# Patient Record
Sex: FEMALE | Race: WHITE
[De-identification: names, ages, dates, MRNs, and addresses within clinical notes are randomized per-mention and may not be internally consistent; named-entity substitution may affect disease eponyms.]

---
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
## 2021-03-15 ENCOUNTER — APPOINTMENT (OUTPATIENT)
Dept: OTOLARYNGOLOGY | Facility: CLINIC | Age: 21
End: 2021-03-15
Payer: MEDICAID

## 2021-03-15 ENCOUNTER — NON-APPOINTMENT (OUTPATIENT)
Age: 21
End: 2021-03-15

## 2021-03-15 DIAGNOSIS — R09.82 POSTNASAL DRIP: ICD-10-CM

## 2021-03-15 PROCEDURE — 99204 OFFICE O/P NEW MOD 45 MIN: CPT | Mod: 25

## 2021-03-15 PROCEDURE — 99072 ADDL SUPL MATRL&STAF TM PHE: CPT

## 2021-03-15 PROCEDURE — 31575 DIAGNOSTIC LARYNGOSCOPY: CPT

## 2021-03-15 NOTE — HISTORY OF PRESENT ILLNESS
[de-identified] : Patient presents today c/o  recurring ear infection . Start since July 2020 had 9 infections since then. Mainly in the left ear. \par Clogged sensation started yesterday. Denies any recent hearing test. Only has take antibiotic twice for infections.\par Denies dizziness. Occasional tinnitus. Itches ears often. \par \par Also complains of recurrent phlegm in her throat.\par

## 2021-03-15 NOTE — PHYSICAL EXAM
[Midline] : trachea located in midline position [Normal] : no rashes [de-identified] : purulent secretions noted at left, cleaned and suctionned [de-identified] : hypertrophic

## 2021-03-15 NOTE — ASSESSMENT
[FreeTextEntry1] : -left otitis externa. culture done. Prescribed floxin. dry ears precautions.\par \par RTC in 1 week. \par \par -PND and phlegm: recommended anti histamines

## 2021-03-18 RX ORDER — NEOMYCIN SULFATE, POLYMYXIN B SULFATE, HYDROCORTISONE 3.5; 10000; 1 MG/ML; [USP'U]/ML; MG/ML
1 SOLUTION/ DROPS AURICULAR (OTIC)
Qty: 1 | Refills: 0 | Status: ACTIVE | COMMUNITY
Start: 2021-03-18 | End: 1900-01-01

## 2021-03-19 LAB — BACTERIA SPEC CULT: ABNORMAL

## 2021-03-22 ENCOUNTER — APPOINTMENT (OUTPATIENT)
Dept: OTOLARYNGOLOGY | Facility: CLINIC | Age: 21
End: 2021-03-22
Payer: MEDICAID

## 2021-03-22 PROCEDURE — 99072 ADDL SUPL MATRL&STAF TM PHE: CPT

## 2021-03-22 PROCEDURE — 99212 OFFICE O/P EST SF 10 MIN: CPT

## 2021-03-22 NOTE — ASSESSMENT
[FreeTextEntry1] : culture results reviewed, interpreted and discussed.\par \par I discussed with the patient the pathophysiology and location on a drawing of an otitis externa, and explained the risk factors of progression into a malignant otitis externa. I recommended in addition to his antibiotic treatment, dry ears precautions, and avoiding any intra-ear instrumentation at home including qtips avoidance.\par \par RTC as needed.

## 2021-03-22 NOTE — REASON FOR VISIT
[Subsequent Evaluation] : a subsequent evaluation for [FreeTextEntry2] : acute otitis externa of left ear

## 2021-03-22 NOTE — PHYSICAL EXAM
[Midline] : trachea located in midline position [Normal] : no rashes [de-identified] : improved left otitis externa

## 2021-03-22 NOTE — HISTORY OF PRESENT ILLNESS
[FreeTextEntry1] : \par 3/22/21: Patient presents today following up on acute otitis externa of left ear. Patient admits feeling better. No otalgia.

## 2021-05-11 ENCOUNTER — LABORATORY RESULT (OUTPATIENT)
Age: 21
End: 2021-05-11

## 2021-05-11 ENCOUNTER — APPOINTMENT (OUTPATIENT)
Dept: OTOLARYNGOLOGY | Facility: CLINIC | Age: 21
End: 2021-05-11
Payer: MEDICAID

## 2021-05-11 PROCEDURE — 99072 ADDL SUPL MATRL&STAF TM PHE: CPT

## 2021-05-11 PROCEDURE — 99214 OFFICE O/P EST MOD 30 MIN: CPT

## 2021-05-11 RX ORDER — OFLOXACIN OTIC 3 MG/ML
0.3 SOLUTION AURICULAR (OTIC) TWICE DAILY
Qty: 1 | Refills: 0 | Status: ACTIVE | COMMUNITY
Start: 2021-03-15 | End: 1900-01-01

## 2021-05-11 NOTE — HISTORY OF PRESENT ILLNESS
[de-identified] : 3/22/21: Patient presents today following up on acute otitis externa of left ear. Patient admits feeling better. No otalgia.  [FreeTextEntry1] : \par 5/11/21: Patient presents today with c/o b/l otalgia. Patient states pain started about 1 week ago. She states pain and clogged sensation in ears. Unsure of drainage from the ear. Currently not on any ear drops or antibiotics.

## 2021-05-11 NOTE — PHYSICAL EXAM
[Normal] : mucosa is normal [Midline] : trachea located in midline position [de-identified] : bilateral canals with copious white debris

## 2021-05-12 ENCOUNTER — LABORATORY RESULT (OUTPATIENT)
Age: 21
End: 2021-05-12

## 2021-05-17 ENCOUNTER — APPOINTMENT (OUTPATIENT)
Dept: OTOLARYNGOLOGY | Facility: CLINIC | Age: 21
End: 2021-05-17
Payer: MEDICAID

## 2021-05-17 DIAGNOSIS — H60.502 UNSPECIFIED ACUTE NONINFECTIVE OTITIS EXTERNA, LEFT EAR: ICD-10-CM

## 2021-05-17 PROCEDURE — 99214 OFFICE O/P EST MOD 30 MIN: CPT | Mod: 25

## 2021-05-17 PROCEDURE — 69210 REMOVE IMPACTED EAR WAX UNI: CPT

## 2021-05-17 PROCEDURE — 99072 ADDL SUPL MATRL&STAF TM PHE: CPT

## 2021-05-17 NOTE — PHYSICAL EXAM
[Midline] : trachea located in midline position [Normal] : no rashes [de-identified] : purulent secretions noted b/l, cleaned and suctionned

## 2021-05-17 NOTE — ASSESSMENT
[FreeTextEntry1] : culture results reviewed and discussed. \par Stop  antibiotics drops.\par Dry ears precautions.\par Patient to use acetic acid.\par \par RTC in 10d

## 2021-05-17 NOTE — HISTORY OF PRESENT ILLNESS
[de-identified] : 3/22/21: Patient presents today following up on acute otitis externa of left ear. Patient admits feeling better. No otalgia. \par \par \par 5/11/21: Patient presents today with c/o b/l otalgia. Patient states pain started about 1 week ago. She states pain and clogged sensation in ears. Unsure of drainage from the ear. Currently not on any ear drops or antibiotics. [FreeTextEntry1] : 05/17/21: Patient presents today with c/o b/l otalgia.  She denies any pain , notices improvement since last visit. Left ear still feels clogged , hearing is slightly muffled .  B/l itchiness in ears .

## 2021-06-01 ENCOUNTER — APPOINTMENT (OUTPATIENT)
Dept: OTOLARYNGOLOGY | Facility: CLINIC | Age: 21
End: 2021-06-01
Payer: MEDICAID

## 2021-06-01 PROCEDURE — 99213 OFFICE O/P EST LOW 20 MIN: CPT

## 2021-06-01 NOTE — ASSESSMENT
[FreeTextEntry1] : reviewed and interpreted culture results. \par \par recommended acetic acid twice a week. RTC in 2M

## 2021-06-01 NOTE — HISTORY OF PRESENT ILLNESS
[de-identified] : 3/22/21: Patient presents today following up on acute otitis externa of left ear. Patient admits feeling better. No otalgia. \par \par \par 5/11/21: Patient presents today with c/o b/l otalgia. Patient states pain started about 1 week ago. She states pain and clogged sensation in ears. Unsure of drainage from the ear. Currently not on any ear drops or antibiotics.\par \par 05/17/21: Patient presents today with c/o b/l otalgia.  She denies any pain , notices improvement since last visit. Left ear still feels clogged , hearing is slightly muffled .  B/l itchiness in ears . [FreeTextEntry1] : \par 6/1/21: Patient following up on acute otitis externa of left ear. Patient admits no otalgia, reoprts "feeling better" . No clogged sensation. Finsihed course of drops prescribed on last visit.

## 2021-08-16 ENCOUNTER — APPOINTMENT (OUTPATIENT)
Dept: OTOLARYNGOLOGY | Facility: CLINIC | Age: 21
End: 2021-08-16
Payer: MEDICAID

## 2021-08-16 DIAGNOSIS — H60.60 UNSPECIFIED CHRONIC OTITIS EXTERNA, UNSPECIFIED EAR: ICD-10-CM

## 2021-08-16 PROCEDURE — 99212 OFFICE O/P EST SF 10 MIN: CPT

## 2021-08-16 NOTE — ASSESSMENT
[FreeTextEntry1] : I counseled the patient regarding avoiding overusing qtips and explained the risks of infection, eardrum perforation, ear canal irritation, and injury, impacted wax with conductive hearing loss...\par \par recovered otitis externa.

## 2021-08-16 NOTE — HISTORY OF PRESENT ILLNESS
[de-identified] : 3/22/21: Patient presents today following up on acute otitis externa of left ear. Patient admits feeling better. No otalgia. \par \par \par 5/11/21: Patient presents today with c/o b/l otalgia. Patient states pain started about 1 week ago. She states pain and clogged sensation in ears. Unsure of drainage from the ear. Currently not on any ear drops or antibiotics.\par \par 05/17/21: Patient presents today with c/o b/l otalgia.  She denies any pain , notices improvement since last visit. Left ear still feels clogged , hearing is slightly muffled .  B/l itchiness in ears .\par \par \par 6/1/21: Patient following up on acute otitis externa of left ear. Patient admits no otalgia, reoprts "feeling better" . No clogged sensation. Finsihed course of drops prescribed on last visit.  [FreeTextEntry1] : \par 8/16/21: Patient following up on chronic otitis externa. Patient doing well. No ear issues.

## 2021-08-30 ENCOUNTER — TRANSCRIPTION ENCOUNTER (OUTPATIENT)
Age: 21
End: 2021-08-30

## 2021-09-01 RX ORDER — ACETIC ACID 20 MG/ML
2 SOLUTION AURICULAR (OTIC)
Qty: 2 | Refills: 0 | Status: ACTIVE | COMMUNITY
Start: 2021-05-17 | End: 1900-01-01

## 2023-05-27 ENCOUNTER — NON-APPOINTMENT (OUTPATIENT)
Age: 23
End: 2023-05-27

## 2023-06-14 ENCOUNTER — NON-APPOINTMENT (OUTPATIENT)
Age: 23
End: 2023-06-14

## 2023-06-14 ENCOUNTER — APPOINTMENT (OUTPATIENT)
Dept: OTOLARYNGOLOGY | Facility: CLINIC | Age: 23
End: 2023-06-14
Payer: MEDICAID

## 2023-06-14 VITALS — BODY MASS INDEX: 26.4 KG/M2 | HEIGHT: 63 IN | WEIGHT: 149 LBS

## 2023-06-14 DIAGNOSIS — J35.8 OTHER CHRONIC DISEASES OF TONSILS AND ADENOIDS: ICD-10-CM

## 2023-06-14 DIAGNOSIS — R07.0 PAIN IN THROAT: ICD-10-CM

## 2023-06-14 PROCEDURE — 99213 OFFICE O/P EST LOW 20 MIN: CPT

## 2023-06-14 NOTE — REASON FOR VISIT
[Subsequent Evaluation] : a subsequent evaluation for [FreeTextEntry2] : enlarged tonsils , history of tonsil stones

## 2023-06-14 NOTE — ASSESSMENT
[FreeTextEntry1] : Patient to gargle with baking soda and f/u in 2M. I explained options for treating tonsil stones from conservative gargling to surgery.

## 2023-06-14 NOTE — HISTORY OF PRESENT ILLNESS
[FreeTextEntry1] : Patient presents today c/o enlarged tonsils , history of tonsil stones .  Patient  states for the last 5 months she has noticed her tonsils are enlarged .  She denies any pain or trouble breathing.

## 2023-08-23 ENCOUNTER — APPOINTMENT (OUTPATIENT)
Dept: OTOLARYNGOLOGY | Facility: CLINIC | Age: 23
End: 2023-08-23

## 2023-09-26 ENCOUNTER — NON-APPOINTMENT (OUTPATIENT)
Age: 23
End: 2023-09-26

## 2024-03-25 ENCOUNTER — EMERGENCY (EMERGENCY)
Facility: HOSPITAL | Age: 24
LOS: 0 days | Discharge: HOME | End: 2024-03-25
Attending: STUDENT IN AN ORGANIZED HEALTH CARE EDUCATION/TRAINING PROGRAM
Payer: MEDICAID

## 2024-03-25 VITALS
OXYGEN SATURATION: 98 % | TEMPERATURE: 99 F | HEIGHT: 63 IN | DIASTOLIC BLOOD PRESSURE: 77 MMHG | SYSTOLIC BLOOD PRESSURE: 122 MMHG | WEIGHT: 154.98 LBS | HEART RATE: 111 BPM

## 2024-03-25 DIAGNOSIS — L50.9 URTICARIA, UNSPECIFIED: ICD-10-CM

## 2024-03-25 PROCEDURE — 96372 THER/PROPH/DIAG INJ SC/IM: CPT

## 2024-03-25 PROCEDURE — 99283 EMERGENCY DEPT VISIT LOW MDM: CPT | Mod: 25

## 2024-03-25 RX ORDER — FAMOTIDINE 10 MG/ML
20 INJECTION INTRAVENOUS ONCE
Refills: 0 | Status: COMPLETED | OUTPATIENT
Start: 2024-03-25 | End: 2024-03-25

## 2024-03-25 RX ORDER — DEXAMETHASONE 0.5 MG/5ML
10 ELIXIR ORAL ONCE
Refills: 0 | Status: COMPLETED | OUTPATIENT
Start: 2024-03-25 | End: 2024-03-25

## 2024-03-25 RX ORDER — HYDROXYZINE HCL 10 MG
1 TABLET ORAL
Qty: 12 | Refills: 0
Start: 2024-03-25 | End: 2024-03-28

## 2024-03-25 RX ORDER — HYDROXYZINE HCL 10 MG
50 TABLET ORAL ONCE
Refills: 0 | Status: COMPLETED | OUTPATIENT
Start: 2024-03-25 | End: 2024-03-25

## 2024-03-25 RX ADMIN — Medication 10 MILLIGRAM(S): at 12:43

## 2024-03-25 RX ADMIN — Medication 50 MILLIGRAM(S): at 12:42

## 2024-03-25 RX ADMIN — FAMOTIDINE 20 MILLIGRAM(S): 10 INJECTION INTRAVENOUS at 12:42

## 2024-03-25 NOTE — ED ADULT NURSE NOTE - NSFALLUNIVINTERV_ED_ALL_ED
Bed/Stretcher in lowest position, wheels locked, appropriate side rails in place/Call bell, personal items and telephone in reach/Instruct patient to call for assistance before getting out of bed/chair/stretcher/Non-slip footwear applied when patient is off stretcher/[place] to call system/Physically safe environment - no spills, clutter or unnecessary equipment/Purposeful proactive rounding/Room/bathroom lighting operational, light cord in reach

## 2024-03-25 NOTE — ED PROVIDER NOTE - CLINICAL SUMMARY MEDICAL DECISION MAKING FREE TEXT BOX
22 yo female, no PMHx, presenting with hives. She states she had a small rash that started in the back of her neck yesterday but then it spread throughout her body. She took Benadryl, prednisone, and hydroxyzine and had some improvement but woke up this morning with worsening hives. Denies fevers, chills, chest pain, shortness of breath, throat swelling, nausea, vomiting, abdominal pain. Well appearing on exam. Diffuse hives on body and extremities. No pharyngeal edema or erythema. Tolerating secretions, uvula midline.  Abdomen soft NDNT. Medications given and effects reassessed. Discussed return precautions and follow up outpatient. Patient comfortable with plan.

## 2024-03-25 NOTE — ED PROVIDER NOTE - PATIENT PORTAL LINK FT
You can access the FollowMyHealth Patient Portal offered by [facility] by registering at the following website: [email or web address]. By joining [employer]’s FollowMyHealth portal, you will also be able to view your health information using other applications (apps) compatible with our system.

## 2024-03-25 NOTE — ED PROVIDER NOTE - OBJECTIVE STATEMENT
22 yo female, no PMHx, presenting with hives. She states she had a small rash that started in the back of her neck yesterday but then it spread throughout her body. She took Benadryl, prednisone, and hydroxyzine and had some improvement but woke up this morning with worsening hives. Denies fevers, chills, chest pain, shortness of breath, throat swelling, nausea, vomiting, abdominal pain.

## 2024-03-25 NOTE — ED ADULT TRIAGE NOTE - CHIEF COMPLAINT QUOTE
Patient coming in for swelling of body x2 dyas- patient has list of items she ate (which is all regualr food for her)- rash and swelling to body x3 days despite benedryl

## 2024-03-25 NOTE — ED PROVIDER NOTE - CARE PROVIDER_API CALL
Javy Wahl  [address] [address]  Phone: [phone]  Fax: [phone]  Follow Up Time: 1-3 Days

## 2024-03-25 NOTE — ED PROVIDER NOTE - NSFOLLOWUPINSTRUCTIONS_ED_ALL_ED_FT
Hives  Hives (urticaria) are itchy, red, swollen areas of skin. They can show up on any part of the body. They often fade within 24 hours (acute hives). If you get new hives after the old ones fade and the cycle goes on for many days or weeks, it is called chronic hives. Hives do not spread from person to person (are not contagious).    Hives can happen when your body reacts to something you are allergic to (allergen) or to something that irritates your skin. When you are exposed to something that triggers hives, your body releases a chemical called histamine. This causes redness, itching, and swelling. Hives can show up right after you are exposed to a trigger or hours later.    What are the causes?  Hives may be caused by:  Food allergies.  Insect bites or stings.  Allergies to pollen or pets.  Spending time in sunlight, heat, or cold (exposure).  Exercise.  Stress.  You can also get hives from other conditions and treatments. These include:  Viruses, such as the common cold.  Bacterial infections, such as urinary tract infections and strep throat.  Certain medicines.  Contact with latex or chemicals.  Allergy shots.  Blood transfusions.  In some cases, the cause of hives is not known (idiopathic hives).    What increases the risk?  You are more likely to get hives if:  You are female.  You have food allergies. Hives are more common if you are allergic to citrus fruits, milk, eggs, peanuts, tree nuts, or shellfish.  You are allergic to:  Medicines.  Latex.  Insects.  Animals.  Pollen.  What are the signs or symptoms?  A red rash on a person's upper arm.  Common symptoms of hives include raised, itchy, red or white bumps or patches on your skin. These areas may:  Become large and swollen (welts).  Quickly change shape and location. This may happen more than once.  Be separate hives or connect over a large area of skin.  Sting or become painful.  Turn white when pressed in the center (elfego).  In severe cases, your hands, feet, and face may also become swollen. This may happen if hives form deeper in your skin.    How is this diagnosed?  Hives may be diagnosed based on your symptoms, medical history, and a physical exam.  You may have skin, pee (urine), or blood tests done. These can help find out what is causing your hives and rule out other health issues.  You may also have a biopsy done. This is when a small piece of skin is removed for testing.  How is this treated?  Treatment for hives depends on the cause and on how severe your symptoms are. You may be told to use cool, wet cloths (cool compresses) or to take cool showers to relieve itching. Treatment may also include:  Medicines to help:  Relieve itching (antihistamines).  Reduce swelling (corticosteroids).  Treat infection (antibiotics).  An injectable medicine called omalizumab. You may need this if you have chronic idiopathic hives and still have symptoms even after you are treated with antihistamines.  In severe cases, you may need to use a device filled with medicine that gives an emergency shot of epinephrine (auto-injector pen) to prevent a very bad allergic reaction (anaphylactic reaction).    Follow these instructions at home:  Medicines    Take and apply over-the-counter and prescription medicines only as told by your health care provider.  If you were prescribed antibiotics, take them as told by your provider. Do not stop using the antibiotic even if you start to feel better.  Skin care    Apply cool compresses to the affected areas.  Do not scratch or rub your skin.  General instructions    Do not take hot showers or baths. This can make itching worse.  Do not wear tight-fitting clothing.  Use sunscreen. Wear protective clothing when you are outside.  Avoid anything that causes your hives. Keep a journal to help track what causes your hives. Write down:  What medicines you take.  What you eat and drink.  What products you use on your skin.  Keep all follow-up visits. Your provider will track how well treatment is working.  Contact a health care provider if:  Your symptoms do not get better with medicine.  Your joints are painful or swollen.  You have a fever.  You have pain in your abdomen.  Get help right away if:  Your tongue, lips, or eyelids swell.  Your chest or throat feels tight.  You have trouble breathing or swallowing.  These symptoms may be an emergency. Use the auto-injector pen right away. Then call 911.  Do not wait to see if the symptoms will go away.  Do not drive yourself to the hospital.  This information is not intended to replace advice given to you by your health care provider. Make sure you discuss any questions you have with your health care provider.

## 2024-03-25 NOTE — ED PROVIDER NOTE - PHYSICAL EXAMINATION
Well appearing on exam.   Diffuse hives on body and extremities.   Heart RRR  Lungs CTAB  No pharyngeal edema or erythema. Tolerating secretions, uvula midline.    No extremity edema  Abdomen soft NDNT.

## 2024-09-05 ENCOUNTER — EMERGENCY (EMERGENCY)
Facility: HOSPITAL | Age: 24
LOS: 0 days | Discharge: HOME | End: 2024-09-05
Payer: MEDICAID
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
## 2024-09-05 DIAGNOSIS — S61.531A PUNCTURE WOUND WITHOUT FOREIGN BODY OF RIGHT WRIST, INITIAL ENCOUNTER: ICD-10-CM
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
## 2024-09-05 DIAGNOSIS — Z20.3 CONTACT WITH AND (SUSPECTED) EXPOSURE TO RABIES: ICD-10-CM

## 2024-09-05 DIAGNOSIS — Z23 ENCOUNTER FOR IMMUNIZATION: ICD-10-CM

## 2024-09-05 PROCEDURE — 90675 RABIES VACCINE IM: CPT

## 2024-09-05 PROCEDURE — 99284 EMERGENCY DEPT VISIT MOD MDM: CPT

## 2024-09-05 PROCEDURE — 96372 THER/PROPH/DIAG INJ SC/IM: CPT

## 2024-09-05 PROCEDURE — 90471 IMMUNIZATION ADMIN: CPT

## 2024-09-05 PROCEDURE — 99283 EMERGENCY DEPT VISIT LOW MDM: CPT | Mod: 25

## 2024-09-05 PROCEDURE — 90377 RABIES IG HT&SOL HUMAN IM/SC: CPT

## 2024-09-05 RX ORDER — RABIES IMMUNE GLOBULIN (HUMAN) 300 [IU]/ML
1450 INJECTION, SOLUTION INFILTRATION; INTRAMUSCULAR ONCE
Refills: 0 | Status: COMPLETED | OUTPATIENT
Start: 2024-09-05 | End: 2024-09-05

## 2024-09-05 RX ORDER — RABIES VIRUS STRAIN PM-1503-3M ANTIGEN (PROPIOLACTONE INACTIVATED) AND WATER 2.5 UNIT
1 KIT INTRAMUSCULAR ONCE
Refills: 0 | Status: COMPLETED | OUTPATIENT
Start: 2024-09-05 | End: 2024-09-05

## 2024-09-05 RX ORDER — AMOXICILLIN AND CLAVULANATE POTASSIUM 250; 125 MG/1; MG/1
875 TABLET, FILM COATED ORAL
Qty: 20 | Refills: 0
Start: 2024-09-05 | End: 2024-09-14

## 2024-09-05 RX ADMIN — RABIES VIRUS STRAIN PM-1503-3M ANTIGEN (PROPIOLACTONE INACTIVATED) AND WATER 1 MILLILITER(S): KIT at 15:25

## 2024-09-05 RX ADMIN — RABIES IMMUNE GLOBULIN (HUMAN) 1450 UNIT(S): 300 INJECTION, SOLUTION INFILTRATION; INTRAMUSCULAR at 15:31

## 2024-09-05 NOTE — ED PROVIDER NOTE - NSFOLLOWUPINSTRUCTIONS_ED_ALL_ED_FT
Please come back in 3,7, and 14 days from now for subsequent injections           Rabies Vaccine Injection  What is this medication?  RABIES VACCINE (ray BEES vak SEEN) reduces the risk of rabies. It does not treat rabies. It is still possible to get rabies after receiving this vaccine, but the symptoms may be less severe or not last as long. It works by helping your immune system learn how to fight off a future infection.    This medicine may be used for other purposes; ask your health care provider or pharmacist if you have questions.    COMMON BRAND NAME(S): Imovax, RabAvert    What should I tell my care team before I take this medication?  They need to know if you have any of these conditions:    Bleeding disorder  Cancer  HIV or AIDS  Immune system problems  Low blood counts, such as low white cells, platelets, or red cell counts  Recent or ongoing radiation therapy  Take medications that prevent or treat blood clots  An unusual or allergic reaction to vaccines, other medications, foods, dyes, or preservatives  Pregnant or trying to get pregnant  Breastfeeding  How should I use this medication?  This vaccine is injected into a muscle. It is given by your care team.    A copy of Vaccine Information Statements will be given before each vaccination. Be sure to read this information carefully each time. This sheet may change often.    Talk to your care team about the use of this medication in children. While it may be prescribed for children and infants, precautions do apply.    Overdosage: If you think you have taken too much of this medicine contact a poison control center or emergency room at once.    NOTE: This medicine is only for you. Do not share this medicine with others.    What if I miss a dose?  Keep appointments for follow-up doses. It is important not to miss your dose. Call your care team if you are unable to keep an appointment.    All of the vaccine doses must be given in order to provide proper protection.    What may interact with this medication?  Antimalarial medications  Certain medications for arthritis  Etanercept  Immune globulins  Infliximab  Medications for organ transplant  Medications to treat cancer  Other vaccines  Steroid medications, such as prednisone or cortisone  This list may not describe all possible interactions. Give your health care provider a list of all the medicines, herbs, non-prescription drugs, or dietary supplements you use. Also tell them if you smoke, drink alcohol, or use illegal drugs. Some items may interact with your medicine.    What should I watch for while using this medication?  Visit your care team regularly.    Report any side effects to your care team right away.    This vaccine, like all vaccines, may not fully protect everyone.    What side effects may I notice from receiving this medication?  Side effects that you should report to your care team as soon as possible:    Allergic reactions or angioedema—skin rash, itching or hives, swelling of the face, eyes, lips, tongue, arms, or legs, trouble swallowing or breathing  Feeling faint or lightheaded  Side effects that usually do not require medical attention (report these to your care team if they continue or are bothersome):    Dizziness  Fever  General discomfort and fatigue  Headache  Joint pain  Muscle pain  Pain, redness, or irritation at injection site  Swollen lymph nodes in the neck, groin, chest, or underarm area  This list may not describe all possible side effects. Call your doctor for medical advice about side effects. You may report side effects to FDA at 9-705-FDA-3591.    Where should I keep my medication?  This vaccine is only given by your care team. It will not be stored at home.    NOTE: This sheet is a summary. It may not cover all possible information. If you have questions about this medicine, talk to your doctor, pharmacist, or health care provider.

## 2024-09-05 NOTE — ED PROVIDER NOTE - CLINICAL SUMMARY MEDICAL DECISION MAKING FREE TEXT BOX
23-year-old presented today after animal bite.  Patient's wound was evaluated.  Patient has superficial bite.  Patient was given rabies, antibiotics and discharged to continue follow-up for next dose of rabies

## 2024-09-05 NOTE — ED PROVIDER NOTE - PHYSICAL EXAMINATION
CONSTITUTIONAL: Well-developed; well-nourished; in no acute distress.   SKIN: warm, 2cm scratch on the right wrist with a small puncture found   HEAD: Normocephalic; atraumatic.  EYES: PERRL, EOMI, no conjunctival erythema  ENT: No nasal discharge; airway clear.  NECK: Supple; non tender.  CARD: S1, S2 normal; no murmurs, gallops, or rubs. Regular rate and rhythm.   RESP: No wheezes, rales or rhonchi.  ABD: soft ntnd  EXT: Normal ROM.  No clubbing, cyanosis or edema.   LYMPH: No acute cervical adenopathy.  NEURO: Alert, oriented, grossly unremarkable  PSYCH: Cooperative, appropriate.

## 2024-09-05 NOTE — ED ADULT NURSE NOTE - HIV OFFER
[facility] ([facility]) ED Follow up Call    Reason for ED visit:           Usman Chung , this is Elias Ram from Dr. Nick Heranndez office, just calling to see how you are doing after your recent ED visit. Did you receive discharge instructions? Yes  Do you understand the discharge instructions? Yes  Did the ED give you any new prescriptions? Yes  Were you able to fill your prescriptions? Yes      Do you have one of our red, yellow and green  Zone sheets that help you to determine when you should go to the ED? Yes      Do you need or want to make a follow up appt with your PCP? Yes    Do you have any further needs in the home i.e. Equipment?   Not Applicable        FU appts/Provider:    Future Appointments   Date Time Provider Awais Cheung   6/29/2021 12:10 PM DESI Gee - CNP fp sc [facility] Previously Declined (within the last year)

## 2024-09-05 NOTE — ED PROVIDER NOTE - PATIENT PORTAL LINK FT
You can access the FollowMyHealth Patient Portal offered by [facility] by registering at the following website: [email or web address]. By joining [employer]’s FollowMyHealth portal, you will also be able to view your health information using other applications (apps) compatible with our system.

## 2024-09-05 NOTE — ED ADULT NURSE NOTE - NSFALLUNIVINTERV_ED_ALL_ED
Bed/Stretcher in lowest position, wheels locked, appropriate side rails in place/Call bell, personal items and telephone in reach/Instruct patient to call for assistance before getting out of bed/chair/stretcher/Non-slip footwear applied when patient is off stretcher/[place] to call system/Physically safe environment - no spills, clutter or unnecessary equipment/Purposeful proactive rounding/Room/bathroom lighting operational, light cord in reach

## 2024-09-05 NOTE — ED PROVIDER NOTE - OBJECTIVE STATEMENT
23-year-old female no significant past medical history presents today for a cat bite.  Patient said a stray cat came up to her yesterday and bit her on the hand and scratched her.  Patient not displaying any neurological symptoms however she is requesting a rabies vaccine.

## 2024-09-08 ENCOUNTER — EMERGENCY (EMERGENCY)
Facility: HOSPITAL | Age: 24
LOS: 0 days | Discharge: HOME | End: 2024-09-08
Attending: EMERGENCY MEDICINE
Payer: MEDICAID

## 2024-09-08 VITALS
WEIGHT: 160.06 LBS | RESPIRATION RATE: 17 BRPM | OXYGEN SATURATION: 100 % | TEMPERATURE: 98 F | HEART RATE: 74 BPM | DIASTOLIC BLOOD PRESSURE: 76 MMHG | SYSTOLIC BLOOD PRESSURE: 123 MMHG

## 2024-09-08 DIAGNOSIS — Z23 ENCOUNTER FOR IMMUNIZATION: ICD-10-CM

## 2024-09-08 DIAGNOSIS — Z20.3 CONTACT WITH AND (SUSPECTED) EXPOSURE TO RABIES: ICD-10-CM

## 2024-09-08 PROCEDURE — 90471 IMMUNIZATION ADMIN: CPT

## 2024-09-08 PROCEDURE — 90675 RABIES VACCINE IM: CPT

## 2024-09-08 PROCEDURE — L9995: CPT

## 2024-09-08 PROCEDURE — 99281 EMR DPT VST MAYX REQ PHY/QHP: CPT | Mod: 25

## 2024-09-08 RX ORDER — RABIES VIRUS STRAIN PM-1503-3M ANTIGEN (PROPIOLACTONE INACTIVATED) AND WATER 2.5 UNIT
1 KIT INTRAMUSCULAR ONCE
Refills: 0 | Status: COMPLETED | OUTPATIENT
Start: 2024-09-08 | End: 2024-09-08

## 2024-09-08 RX ADMIN — RABIES VIRUS STRAIN PM-1503-3M ANTIGEN (PROPIOLACTONE INACTIVATED) AND WATER 1 MILLILITER(S): KIT at 11:30

## 2024-09-08 NOTE — ED PROVIDER NOTE - PHYSICAL EXAMINATION
CONSTITUTIONAL: well-appearing, in NAD  SKIN: Warm dry, normal skin turgor, well healed wound to right thenar eminence    HEAD: NCAT  EYES: EOMI, PERRLA, no scleral icterus, conjunctiva pink  ENT: normal pharynx with no erythema or exudates  NECK: Supple; non tender. Full ROM.  CARD: RRR, no murmurs.  RESP: clear to ausculation b/l. No crackles or wheezing.  ABD: soft, non-tender, non-distended, no rebound or guarding.  EXT: Full ROM, no bony tenderness, no pedal edema, no calf tenderness  NEURO: normal motor. normal sensory.   PSYCH: Cooperative, appropriate.

## 2024-09-08 NOTE — ED ADULT NURSE NOTE - DOES PATIENT HAVE ADVANCE DIRECTIVE
Barbara Pool - dermatologist  Return in about 6 months (around 2021) for Recheck, Labs.  I will call you with your lab results.   Please call with any questions or concerns.       Annual Wellness  Personal Prevention Plan of Service     Date of Office Visit:  2021  Encounter Provider:  MINGO Chaidez  Place of Service:  [facility]  Patient Name: Sierra Teague  :  1962    As part of the Annual Wellness portion of your visit today, we are providing you with this personalized preventive plan of services (PPPS). This plan is based upon recommendations of the [place] Preventive Services Task Force (USPSTF) and the Advisory Committee on Immunization Practices (ACIP).    This lists the preventive care services that should be considered, and provides dates of when you are due. Items listed as completed are up-to-date and do not require any further intervention.    Health Maintenance   Topic Date Due   • ZOSTER VACCINE (1 of 2) 2013   • MAMMOGRAM  2019   • PAP SMEAR  2021   • COLONOSCOPY  2021   • LIPID PANEL  2022   • ANNUAL PHYSICAL  2022   • TDAP/TD VACCINES (2 - Td) 2028   • HEPATITIS C SCREENING  Completed   • INFLUENZA VACCINE  Completed   • Pneumococcal Vaccine 0-64  Aged Out   • MENINGOCOCCAL VACCINE  Aged Out       Orders Placed This Encounter   Procedures   • Mammo Screening Digital Tomosynthesis Bilateral With CAD     Standing Status:   Future     Standing Expiration Date:   2022     Order Specific Question:   Reason for Exam:     Answer:   Breast cancer screening.       Return in about 6 months (around 2021) for Recheck, Labs.          
No

## 2024-09-08 NOTE — ED PROVIDER NOTE - PATIENT PORTAL LINK FT
You can access the FollowMyHealth Patient Portal offered by [facility] by registering at the following website: [email or web address]. By joining [employer]’s FollowMyHealth portal, you will also be able to view your health information using other applications (apps) compatible with our system.

## 2024-09-08 NOTE — ED ADULT NURSE NOTE - NSFALLUNIVINTERV_ED_ALL_ED
Bed/Stretcher in lowest position, wheels locked, appropriate side rails in place/Call bell, personal items and telephone in reach/Instruct patient to call for assistance before getting out of bed/chair/stretcher/Non-slip footwear applied when patient is off stretcher/[place] to call system/Physically safe environment - no spills, clutter or unnecessary equipment/Purposeful proactive rounding/Room/bathroom lighting operational, light cord in reach

## 2024-09-08 NOTE — ED PROVIDER NOTE - CLINICAL SUMMARY MEDICAL DECISION MAKING FREE TEXT BOX
no
pt here for day 3 rabies booster with her will return in 4 days for third shot in series. bite to hand is healing.

## 2024-09-08 NOTE — ED PROVIDER NOTE - OBJECTIVE STATEMENT
Patient is a 23y female no pmhx presenting for follow up of rabies vaccination. Otherwise denies any fever, chills, headache, changes in vision, cough, congestion, cp, palpitations, sob, n/v/d, abd pain, constipation, urinary complaints, lower extremity pain/swelling.

## 2024-09-19 ENCOUNTER — EMERGENCY (EMERGENCY)
Facility: HOSPITAL | Age: 24
LOS: 0 days | Discharge: HOME | End: 2024-09-19
Attending: EMERGENCY MEDICINE
Payer: MEDICAID

## 2024-09-19 VITALS
HEART RATE: 59 BPM | TEMPERATURE: 98 F | HEIGHT: 63 IN | SYSTOLIC BLOOD PRESSURE: 112 MMHG | WEIGHT: 160.06 LBS | OXYGEN SATURATION: 100 % | DIASTOLIC BLOOD PRESSURE: 73 MMHG | RESPIRATION RATE: 18 BRPM

## 2024-09-19 DIAGNOSIS — Z20.3 CONTACT WITH AND (SUSPECTED) EXPOSURE TO RABIES: ICD-10-CM

## 2024-09-19 DIAGNOSIS — Z23 ENCOUNTER FOR IMMUNIZATION: ICD-10-CM

## 2024-09-19 PROCEDURE — 90675 RABIES VACCINE IM: CPT

## 2024-09-19 PROCEDURE — L9995: CPT

## 2024-09-19 PROCEDURE — 90471 IMMUNIZATION ADMIN: CPT

## 2024-09-19 PROCEDURE — 99281 EMR DPT VST MAYX REQ PHY/QHP: CPT | Mod: 25

## 2024-09-19 RX ORDER — RABIES VIRUS STRAIN PM-1503-3M ANTIGEN (PROPIOLACTONE INACTIVATED) AND WATER 2.5 UNIT
1 KIT INTRAMUSCULAR ONCE
Refills: 0 | Status: COMPLETED | OUTPATIENT
Start: 2024-09-19 | End: 2024-09-19

## 2024-09-19 RX ADMIN — RABIES VIRUS STRAIN PM-1503-3M ANTIGEN (PROPIOLACTONE INACTIVATED) AND WATER 1 MILLILITER(S): KIT at 11:59

## 2024-09-19 NOTE — ED PROVIDER NOTE - ATTENDING APP SHARED VISIT CONTRIBUTION OF CARE
I have reviewed and agree with the mid-level note, except as documented in my note below.    23-year-old female denies significant PMH status post recent cat bite, now presents for fourth dose of rabies vaccine (states got third dose at an outside facility), reports wound is healing and denies signs or symptoms of infection. Old chart reviewed. I have reviewed and agree with the initial nursing note, except as documented in my note. VSS, healing wound noted.

## 2024-09-19 NOTE — ED PROVIDER NOTE - OBJECTIVE STATEMENT
22 yo female with no pertinent pmh presents for f/u rabies vaccine. pt denies any symptoms including fevers, chill, headache, recent illness/travel, cough, abdominal pain, chest pain, or SOB.

## 2024-09-19 NOTE — ED PROVIDER NOTE - PATIENT PORTAL LINK FT
You can access the FollowMyHealth Patient Portal offered by [facility] by registering at the following website: [email or web address]. By joining [employer]’s FollowMyHealth portal, you will also be able to view your health information using other applications (apps) compatible with our system.

## 2024-09-19 NOTE — ED PROVIDER NOTE - CLINICAL SUMMARY MEDICAL DECISION MAKING FREE TEXT BOX
Patient is not immunocompromised, and there is no bullae, pain out of proportion, or rapid progression concerning for necrotizing fasciitis. Wound care instructions provided. They were instructed on signs and symptoms of wound infection, including pain, fever, redness, swelling, lymphangitis, sensory/motor deficits, and instructed for them to return to PCP or ED immediately should these symptoms present. They verbally expressed understanding and all questions were addressed to their satisfaction.

## 2024-09-19 NOTE — ED PROVIDER NOTE - CCCP TRG CHIEF CMPLNT
TRANSFER - IN REPORT:    Verbal report received from Megha Coles RN(name) on [address]  being received from ED(unit) for routine progression of care      Report consisted of patients Situation, Background, Assessment and   Recommendations(SBAR). Information from the following report(s) SBAR, Kardex, ED Summary, Procedure Summary, Intake/Output, MAR, Accordion, Recent Results, Med Rec Status, Cardiac Rhythm NSR, Alarm Parameters , Pre Procedure Checklist, Procedure Verification and Quality Measures was reviewed with the receiving nurse. Opportunity for questions and clarification was provided. Assessment completed upon patients arrival to unit and care assumed. Primary Nurse Joy Canales RN and Trevon Mancilla RN, RN performed a dual skin assessment on this patient No impairment noted    [facility]      David score is 23    SHIFT SUMMARY:  Patient rec'd 6mg Ativan this shift per [facility] protocol. rabies follow up

## 2024-09-19 NOTE — ED ADULT NURSE NOTE - NSFALLUNIVINTERV_ED_ALL_ED
Bed/Stretcher in lowest position, wheels locked, appropriate side rails in place/Call bell, personal items and telephone in reach/Instruct patient to call for assistance before getting out of bed/chair/stretcher/Non-slip footwear applied when patient is off stretcher/[place] to call system/Physically safe environment - no spills, clutter or unnecessary equipment/Purposeful proactive rounding/Room/bathroom lighting operational, light cord in reach

## 2024-09-19 NOTE — ED PROVIDER NOTE - NSFOLLOWUPINSTRUCTIONS_ED_ALL_ED_FT
Follow up with your doctor in 3-5 days.  If you do not have a doctor, CALL [phone] to find a physician to follow up with.

## 2024-10-21 ENCOUNTER — APPOINTMENT (OUTPATIENT)
Dept: OTOLARYNGOLOGY | Facility: CLINIC | Age: 24
End: 2024-10-21